# Patient Record
Sex: MALE | Race: WHITE | URBAN - METROPOLITAN AREA
[De-identification: names, ages, dates, MRNs, and addresses within clinical notes are randomized per-mention and may not be internally consistent; named-entity substitution may affect disease eponyms.]

---

## 2023-12-26 ENCOUNTER — HOSPITAL ENCOUNTER (OUTPATIENT)
Dept: CT IMAGING | Facility: HOSPITAL | Age: 58
Discharge: HOME | End: 2023-12-26

## 2023-12-26 DIAGNOSIS — Z13.6 SCREENING FOR CARDIOVASCULAR CONDITION: ICD-10-CM

## 2023-12-27 ENCOUNTER — PATIENT MESSAGE (OUTPATIENT)
Facility: CLINIC | Age: 58
End: 2023-12-27

## 2023-12-27 NOTE — TELEPHONE ENCOUNTER
Thank you  I sent reply to patient advising that he call Dr. Dakotah Last office. He has never seen our providers in [facility].

## 2023-12-27 NOTE — TELEPHONE ENCOUNTER
From: Georgette Will  To: Diego Polk  Sent: 12/27/2023 11:45 AM CST  Subject: Heart Scan    I had heart scan yesterday and there was no nurse present so I left without the nurse's report. I got the results of the imaging without the cardic report. The technician said I would get a call this morning. I have not received a call and the report is not in my mychart.

## 2023-12-27 NOTE — TELEPHONE ENCOUNTER
Dr. Jacque Ahumada    Patient sent below message to [facility] in error. We have never seen him.     Thank you

## 2023-12-27 NOTE — PROGRESS NOTES
Date of Service 12/27/2023    Deandre Frazier  Date of Birth 12/21/1965    Patient Age: 59year old    PCP: Finesse Mcmanus MD  [facility] [address] [facility] [address]  [place] [id number]    Heart Scan Consult  Preliminary Heart Scan Score: 3.5    Previous Screening  Heart Scan Completed Previously: No        Peripheral Vascular Scan Completed Previously: No          Risk Factors  Personal Risk Factors  Non-alterable Risk Factors: Age;Gender  Alterable Risk Factors: Diabetes      Body Mass Index  There is no height or weight on file to calculate BMI. Blood Pressure  105/70 - checked at home    (Normal =< 120/80,  Elevated = 120-129/ >80,  High Stage1 130-139/80-89 , Stage2 >140/>90)    Lipid Profile    Cholesterol Goals  Value   Total  =< 200   HDL  = > 45 Men = > 55 Women   LDL   =< 100   Triglycerides  =< 150       Glucose and Hemoglobin A1C  HgbA1c - 4.0% recently taken 11/2023 per pt  (Normal Fasting Glucose < 100mg/dl )    Nurse Review  Risk factor information and results reviewed with Nurse: Yes    Recommended Follow Up:  Consult your physician regarding[de-identified] Final Heart Scan Report; Discuss potential for Incidental Finding      Recommendations for Change:  Nutrition Changes: Low Saturated Fat;Low Salt Eating; Low Fat Dairy; Increase Fiber    Cholesterol Modification (goal of therapy depends upon your risk): No Change Needed (Reports Cholesterol levels came back normal (last checked 11/2023). )    Exercise:  (Reports exercising regularly)         Weight Management:  (Reports losing 35 lbs recently)         Repeat Heart Scan: 3 Years if Calcium Score is > 0. 0; Discuss with your Physician              [place] Recommended Resources:  Recommended Resources: Upcoming Classes, Medical Services and Health Library [email or web address]     Other Resources[de-identified] Handouts provided - Controlling Risk Factors, Cholesterol, and Diabetes      Sasha Tatum RN        Please Contact the Nurse Heart Line with any Questions or Concerns [phone].